# Patient Record
Sex: FEMALE | Race: BLACK OR AFRICAN AMERICAN | NOT HISPANIC OR LATINO
[De-identification: names, ages, dates, MRNs, and addresses within clinical notes are randomized per-mention and may not be internally consistent; named-entity substitution may affect disease eponyms.]

---

## 2023-07-03 ENCOUNTER — APPOINTMENT (OUTPATIENT)
Dept: ORTHOPEDIC SURGERY | Facility: CLINIC | Age: 33
End: 2023-07-03
Payer: OTHER MISCELLANEOUS

## 2023-07-03 DIAGNOSIS — Z78.9 OTHER SPECIFIED HEALTH STATUS: ICD-10-CM

## 2023-07-03 DIAGNOSIS — S93.491A SPRAIN OF OTHER LIGAMENT OF RIGHT ANKLE, INITIAL ENCOUNTER: ICD-10-CM

## 2023-07-03 DIAGNOSIS — Z00.00 ENCOUNTER FOR GENERAL ADULT MEDICAL EXAMINATION W/OUT ABNORMAL FINDINGS: ICD-10-CM

## 2023-07-03 PROCEDURE — 73630 X-RAY EXAM OF FOOT: CPT | Mod: RT

## 2023-07-03 PROCEDURE — 99204 OFFICE O/P NEW MOD 45 MIN: CPT

## 2023-07-03 PROCEDURE — 73600 X-RAY EXAM OF ANKLE: CPT | Mod: RT

## 2023-07-03 NOTE — PHYSICAL EXAM
[Right] : right foot and ankle [5___] : eversion 5[unfilled]/5 [2+] : dorsalis pedis pulse: 2+ [NL (40)] : plantar flexion 40 degrees [NL 30)] : inversion 30 degrees [NL (20)] : eversion 20 degrees [] : negative anterior drawer at ankle [TWNoteComboBox7] : dorsiflexion 15 degrees [de-identified] : plantar flexion 30 degrees
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
## 2023-07-03 NOTE — WORK
[Was the competent medical cause of the injury] : was the competent medical cause of the injury [Are consistent with the injury] : are consistent with the injury [Total (100%)] : total (100%) [Does not reveal pre-existing condition(s) that may affect treatment/prognosis] : does not reveal pre-existing condition(s) that may affect treatment/prognosis [Crutches] : crutches [Cannot return to work because ________] : cannot return to work because [unfilled] [No Rx restrictions] : No Rx restrictions. [I provided the services listed above] :  I provided the services listed above. [Sprain/Strain] : sprain/strain [Consistent with my objective findings] : consistent with my objective findings [N/A] : : Not Applicable [Patient] : patient [No] : No [FreeTextEntry1] : good
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
## 2023-07-04 ENCOUNTER — TRANSCRIPTION ENCOUNTER (OUTPATIENT)
Age: 33
End: 2023-07-04

## 2023-07-06 ENCOUNTER — APPOINTMENT (OUTPATIENT)
Dept: MRI IMAGING | Facility: CLINIC | Age: 33
End: 2023-07-06

## 2023-07-10 ENCOUNTER — APPOINTMENT (OUTPATIENT)
Dept: ORTHOPEDIC SURGERY | Facility: CLINIC | Age: 33
End: 2023-07-10

## 2023-07-15 ENCOUNTER — FORM ENCOUNTER (OUTPATIENT)
Age: 33
End: 2023-07-15

## 2023-07-16 ENCOUNTER — APPOINTMENT (OUTPATIENT)
Dept: MRI IMAGING | Facility: CLINIC | Age: 33
End: 2023-07-16
Payer: OTHER MISCELLANEOUS

## 2023-07-16 PROCEDURE — 73721 MRI JNT OF LWR EXTRE W/O DYE: CPT | Mod: RT

## 2023-07-21 ENCOUNTER — APPOINTMENT (OUTPATIENT)
Dept: ORTHOPEDIC SURGERY | Facility: CLINIC | Age: 33
End: 2023-07-21
Payer: OTHER MISCELLANEOUS

## 2023-07-21 VITALS — HEIGHT: 64 IN | BODY MASS INDEX: 25.61 KG/M2 | WEIGHT: 150 LBS

## 2023-07-21 DIAGNOSIS — S50.01XA CONTUSION OF RIGHT ELBOW, INITIAL ENCOUNTER: ICD-10-CM

## 2023-07-21 DIAGNOSIS — G56.22 LESION OF ULNAR NERVE, LEFT UPPER LIMB: ICD-10-CM

## 2023-07-21 DIAGNOSIS — S46.911A STRAIN OF UNSPECIFIED MUSCLE, FASCIA AND TENDON AT SHOULDER AND UPPER ARM LEVEL, RIGHT ARM, INITIAL ENCOUNTER: ICD-10-CM

## 2023-07-21 PROCEDURE — 73080 X-RAY EXAM OF ELBOW: CPT | Mod: RT

## 2023-07-21 PROCEDURE — 99213 OFFICE O/P EST LOW 20 MIN: CPT

## 2023-07-21 NOTE — PHYSICAL EXAM
[5 ___] : forward flexion 5[unfilled]/5 [5___] : internal rotation 5[unfilled]/5 [Right] : right elbow [NL (0)] : extension 0 degrees [NL (90)] : pronation 90 degrees [4___] : supination 4[unfilled]/5 [FreeTextEntry8] : ttp right trap [] : no erythema [de-identified] : lateral >> medial [TWNoteComboBox7] : flexion 140 degrees [de-identified] : supination 80 degrees

## 2023-07-21 NOTE — DISCUSSION/SUMMARY
[de-identified] : Progress Note completed by Irene Johnson PA-C\par * Dr. Restrepo -- The documentation recorded in this note accurately reflects the decisions made by me during this visit.
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
## 2023-07-21 NOTE — WORK
[Sprain/Strain] : sprain/strain [Was the competent medical cause of the injury] : was the competent medical cause of the injury [Are consistent with the injury] : are consistent with the injury [Consistent with my objective findings] : consistent with my objective findings [Total (100%)] : total (100%) [Does not reveal pre-existing condition(s) that may affect treatment/prognosis] : does not reveal pre-existing condition(s) that may affect treatment/prognosis [Cannot return to work because ________] : cannot return to work because [unfilled] [Lifting] : lifting [Pulling/Pushing] : pulling/pushing [Reaching overhead] : reaching overhead [Use of upper extremities] : use of upper extremities [Unknown at this time] : : unknown at this time [Patient] : patient [No Rx restrictions] : No Rx restrictions.

## 2023-07-24 ENCOUNTER — APPOINTMENT (OUTPATIENT)
Dept: ORTHOPEDIC SURGERY | Facility: CLINIC | Age: 33
End: 2023-07-24
Payer: OTHER MISCELLANEOUS

## 2023-07-24 VITALS — BODY MASS INDEX: 25.61 KG/M2 | HEIGHT: 64 IN | WEIGHT: 150 LBS

## 2023-07-24 PROCEDURE — 99214 OFFICE O/P EST MOD 30 MIN: CPT

## 2023-07-24 PROCEDURE — L4350: CPT | Mod: RT

## 2023-07-24 NOTE — WORK
[Sprain/Strain] : sprain/strain [Was the competent medical cause of the injury] : was the competent medical cause of the injury [Are consistent with the injury] : are consistent with the injury [Consistent with my objective findings] : consistent with my objective findings [Does not reveal pre-existing condition(s) that may affect treatment/prognosis] : does not reveal pre-existing condition(s) that may affect treatment/prognosis [Patient] : patient [No] : No [No Rx restrictions] : No Rx restrictions. [I provided the services listed above] :  I provided the services listed above. [Partial] : partial [Other: ___] : [unfilled] [Can return to work with limitations on ______] : can return to work with limitations on [unfilled] [Unknown at this time] : : unknown at this time [FreeTextEntry1] : good

## 2023-07-24 NOTE — DATA REVIEWED
[MRI] : MRI [Right] : of the right [Ankle] : ankle [I reviewed the films/CD and additionally noted] : I reviewed the films/CD and additionally noted [FreeTextEntry1] : mild sprain/synovitis
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
## 2023-08-18 ENCOUNTER — APPOINTMENT (OUTPATIENT)
Dept: ORTHOPEDIC SURGERY | Facility: CLINIC | Age: 33
End: 2023-08-18
Payer: OTHER MISCELLANEOUS

## 2023-08-18 DIAGNOSIS — Z78.9 OTHER SPECIFIED HEALTH STATUS: ICD-10-CM

## 2023-08-18 PROCEDURE — 99213 OFFICE O/P EST LOW 20 MIN: CPT

## 2023-08-18 RX ORDER — IBUPROFEN 600 MG/1
600 TABLET, FILM COATED ORAL TWICE DAILY
Qty: 60 | Refills: 2 | Status: ACTIVE | COMMUNITY
Start: 2023-08-18 | End: 1900-01-01

## 2023-08-18 NOTE — WORK
[Sprain/Strain] : sprain/strain [Was the competent medical cause of the injury] : was the competent medical cause of the injury [Are consistent with the injury] : are consistent with the injury [Consistent with my objective findings] : consistent with my objective findings [Mild Partial] : mild partial [Does not reveal pre-existing condition(s) that may affect treatment/prognosis] : does not reveal pre-existing condition(s) that may affect treatment/prognosis [Can return to work with limitations on ______] : can return to work with limitations on [unfilled] [Lifting] : lifting [Pulling/Pushing] : pulling/pushing [Reaching overhead] : reaching overhead [Use of upper extremities] : use of upper extremities [Unknown at this time] : : unknown at this time [Patient] : patient [No Rx restrictions] : No Rx restrictions.

## 2023-08-18 NOTE — DISCUSSION/SUMMARY
[de-identified] : rtw on 8/21/23 - no lifting, pushing, pulling.  defers csi.  nsaids prn. continue pt.  counterforce brace.  follow up in 4 weeks - if continued pain, will consider csi.   Progress note completed by Irene Johnson PA-C *Dr. Restrepo - The ROSALBA assigned on this date is under my supervision and saw this patient independently.  I have reviewed the note/plan and agree with the treatment provided.
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
## 2023-08-18 NOTE — PHYSICAL EXAM
[5 ___] : forward flexion 5[unfilled]/5 [5___] : internal rotation 5[unfilled]/5 [FreeTextEntry8] : ttp right trap [Right] : right elbow [NL (0)] : extension 0 degrees [NL (90)] : pronation 90 degrees [4___] : supination 4[unfilled]/5 [] : light touch intact [de-identified] : lateral >> medial [TWNoteComboBox7] : flexion 140 degrees [de-identified] : supination 80 degrees
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
## 2023-08-28 ENCOUNTER — APPOINTMENT (OUTPATIENT)
Dept: ORTHOPEDIC SURGERY | Facility: CLINIC | Age: 33
End: 2023-08-28
Payer: OTHER MISCELLANEOUS

## 2023-08-28 VITALS — HEIGHT: 64 IN | WEIGHT: 150 LBS | BODY MASS INDEX: 25.61 KG/M2

## 2023-08-28 PROCEDURE — 99213 OFFICE O/P EST LOW 20 MIN: CPT

## 2023-08-28 NOTE — PHYSICAL EXAM
[Right] : right foot and ankle [NL (40)] : plantar flexion 40 degrees [NL 30)] : inversion 30 degrees [NL (20)] : eversion 20 degrees [5___] : eversion 5[unfilled]/5 [2+] : dorsalis pedis pulse: 2+ [] : negative anterior drawer at ankle [FreeTextEntry8] : improved [TWNoteComboBox7] : dorsiflexion 15 degrees

## 2023-08-28 NOTE — WORK
[Sprain/Strain] : sprain/strain [Was the competent medical cause of the injury] : was the competent medical cause of the injury [Are consistent with the injury] : are consistent with the injury [Consistent with my objective findings] : consistent with my objective findings [Partial] : partial [Does not reveal pre-existing condition(s) that may affect treatment/prognosis] : does not reveal pre-existing condition(s) that may affect treatment/prognosis [Other: ___] : [unfilled] [Patient] : patient [No] : No [No Rx restrictions] : No Rx restrictions. [I provided the services listed above] :  I provided the services listed above. [Can return to work without limitations on ______] : can return to work without limitations on [unfilled] [N/A] : : Not Applicable [FreeTextEntry1] : good
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
## 2023-09-19 ENCOUNTER — APPOINTMENT (OUTPATIENT)
Dept: ORTHOPEDIC SURGERY | Facility: CLINIC | Age: 33
End: 2023-09-19
Payer: OTHER MISCELLANEOUS

## 2023-09-19 VITALS — BODY MASS INDEX: 25.61 KG/M2 | WEIGHT: 150 LBS | HEIGHT: 64 IN

## 2023-09-19 VITALS — WEIGHT: 150 LBS | BODY MASS INDEX: 25.61 KG/M2 | HEIGHT: 64 IN

## 2023-09-19 PROCEDURE — 73110 X-RAY EXAM OF WRIST: CPT | Mod: RT

## 2023-09-19 PROCEDURE — 99214 OFFICE O/P EST MOD 30 MIN: CPT

## 2023-10-16 ENCOUNTER — APPOINTMENT (OUTPATIENT)
Dept: ORTHOPEDIC SURGERY | Facility: CLINIC | Age: 33
End: 2023-10-16
Payer: OTHER MISCELLANEOUS

## 2023-10-16 VITALS — BODY MASS INDEX: 25.61 KG/M2 | WEIGHT: 150 LBS | HEIGHT: 64 IN

## 2023-10-16 DIAGNOSIS — S93.491D SPRAIN OF OTHER LIGAMENT OF RIGHT ANKLE, SUBSEQUENT ENCOUNTER: ICD-10-CM

## 2023-10-16 PROCEDURE — 99213 OFFICE O/P EST LOW 20 MIN: CPT

## 2023-10-17 ENCOUNTER — APPOINTMENT (OUTPATIENT)
Dept: ORTHOPEDIC SURGERY | Facility: CLINIC | Age: 33
End: 2023-10-17
Payer: OTHER MISCELLANEOUS

## 2023-10-17 PROCEDURE — 99213 OFFICE O/P EST LOW 20 MIN: CPT

## 2023-12-01 ENCOUNTER — APPOINTMENT (OUTPATIENT)
Dept: ORTHOPEDIC SURGERY | Facility: CLINIC | Age: 33
End: 2023-12-01
Payer: OTHER MISCELLANEOUS

## 2023-12-01 DIAGNOSIS — S46.911D STRAIN OF UNSPECIFIED MUSCLE, FASCIA AND TENDON AT SHOULDER AND UPPER ARM LEVEL, RIGHT ARM, SUBSEQUENT ENCOUNTER: ICD-10-CM

## 2023-12-01 DIAGNOSIS — S63.501S UNSPECIFIED SPRAIN OF RIGHT WRIST, SEQUELA: ICD-10-CM

## 2023-12-01 DIAGNOSIS — M77.8 OTHER ENTHESOPATHIES, NOT ELSEWHERE CLASSIFIED: ICD-10-CM

## 2023-12-01 PROCEDURE — 99213 OFFICE O/P EST LOW 20 MIN: CPT

## 2024-02-09 ENCOUNTER — APPOINTMENT (OUTPATIENT)
Dept: ORTHOPEDIC SURGERY | Facility: CLINIC | Age: 34
End: 2024-02-09